# Patient Record
Sex: FEMALE | Race: WHITE
[De-identification: names, ages, dates, MRNs, and addresses within clinical notes are randomized per-mention and may not be internally consistent; named-entity substitution may affect disease eponyms.]

---

## 2017-12-08 ENCOUNTER — HOSPITAL ENCOUNTER (OUTPATIENT)
Dept: HOSPITAL 8 - STAR | Age: 52
Discharge: HOME | End: 2017-12-08
Attending: OBSTETRICS & GYNECOLOGY
Payer: MEDICARE

## 2017-12-08 DIAGNOSIS — Z01.818: Primary | ICD-10-CM

## 2017-12-08 DIAGNOSIS — R82.99: ICD-10-CM

## 2017-12-08 DIAGNOSIS — N84.0: ICD-10-CM

## 2017-12-08 DIAGNOSIS — N95.0: ICD-10-CM

## 2017-12-08 LAB
AST SERPL-CCNC: 15 U/L (ref 15–37)
BUN SERPL-MCNC: 11 MG/DL (ref 7–18)
HCT VFR BLD CALC: 41.1 % (ref 34.6–47.8)
HGB BLD-MCNC: 13.7 G/DL (ref 11.7–16.4)
WBC # BLD AUTO: 7 X10^3/UL (ref 3.4–10)

## 2017-12-08 PROCEDURE — 84703 CHORIONIC GONADOTROPIN ASSAY: CPT

## 2017-12-08 PROCEDURE — 93005 ELECTROCARDIOGRAM TRACING: CPT

## 2017-12-08 PROCEDURE — 87086 URINE CULTURE/COLONY COUNT: CPT

## 2017-12-08 PROCEDURE — 81001 URINALYSIS AUTO W/SCOPE: CPT

## 2017-12-08 PROCEDURE — 85025 COMPLETE CBC W/AUTO DIFF WBC: CPT

## 2017-12-08 PROCEDURE — 84702 CHORIONIC GONADOTROPIN TEST: CPT

## 2017-12-08 PROCEDURE — 80053 COMPREHEN METABOLIC PANEL: CPT

## 2017-12-08 PROCEDURE — 36415 COLL VENOUS BLD VENIPUNCTURE: CPT

## 2017-12-08 PROCEDURE — 71020: CPT

## 2019-11-04 ENCOUNTER — HOSPITAL ENCOUNTER (EMERGENCY)
Dept: HOSPITAL 8 - ED | Age: 54
Discharge: HOME | End: 2019-11-04
Payer: MEDICARE

## 2019-11-04 VITALS — SYSTOLIC BLOOD PRESSURE: 140 MMHG | DIASTOLIC BLOOD PRESSURE: 80 MMHG

## 2019-11-04 VITALS — WEIGHT: 220.46 LBS | HEIGHT: 59 IN | BODY MASS INDEX: 44.44 KG/M2

## 2019-11-04 DIAGNOSIS — R04.0: Primary | ICD-10-CM

## 2019-11-04 PROCEDURE — 30901 CONTROL OF NOSEBLEED: CPT

## 2019-11-04 PROCEDURE — 99284 EMERGENCY DEPT VISIT MOD MDM: CPT

## 2019-11-04 PROCEDURE — 99283 EMERGENCY DEPT VISIT LOW MDM: CPT

## 2021-03-30 ENCOUNTER — HOSPITAL ENCOUNTER (EMERGENCY)
Dept: HOSPITAL 8 - ED | Age: 56
Discharge: HOME | End: 2021-03-30
Payer: MEDICAID

## 2021-03-30 VITALS — WEIGHT: 220.46 LBS | HEIGHT: 59 IN | BODY MASS INDEX: 44.44 KG/M2

## 2021-03-30 VITALS — SYSTOLIC BLOOD PRESSURE: 182 MMHG | DIASTOLIC BLOOD PRESSURE: 87 MMHG

## 2021-03-30 DIAGNOSIS — E78.5: ICD-10-CM

## 2021-03-30 DIAGNOSIS — I10: ICD-10-CM

## 2021-03-30 DIAGNOSIS — Z76.0: ICD-10-CM

## 2021-03-30 DIAGNOSIS — J45.909: Primary | ICD-10-CM

## 2021-03-30 PROCEDURE — 99281 EMR DPT VST MAYX REQ PHY/QHP: CPT

## 2021-03-30 NOTE — NUR
PT STATES THAT SHE CAME INTO THE ED BECAUSE SHE NEEDED HER MEDS REFILLED. 
HAVING TROUBLE FINDING A PCP. VITAL SIGNS STABLE /103. PT A&[id number]. SPEAKS 
CLEARLY.